# Patient Record
Sex: MALE | Race: WHITE | NOT HISPANIC OR LATINO | Employment: FULL TIME | URBAN - METROPOLITAN AREA
[De-identification: names, ages, dates, MRNs, and addresses within clinical notes are randomized per-mention and may not be internally consistent; named-entity substitution may affect disease eponyms.]

---

## 2019-08-30 ENCOUNTER — THERAPY VISIT (OUTPATIENT)
Dept: PHYSICAL THERAPY | Facility: CLINIC | Age: 59
End: 2019-08-30
Payer: COMMERCIAL

## 2019-08-30 DIAGNOSIS — M54.50 BILATERAL LOW BACK PAIN WITHOUT SCIATICA: ICD-10-CM

## 2019-08-30 PROCEDURE — 97161 PT EVAL LOW COMPLEX 20 MIN: CPT | Mod: GP | Performed by: PHYSICAL THERAPIST

## 2019-08-30 PROCEDURE — 97110 THERAPEUTIC EXERCISES: CPT | Mod: GP | Performed by: PHYSICAL THERAPIST

## 2019-08-30 ASSESSMENT — ACTIVITIES OF DAILY LIVING (ADL)
DEEP_SQUATTING: MODERATE DIFFICULTY
STANDING_FOR_15_MINUTES: MODERATE DIFFICULTY
RECREATIONAL_ACTIVITIES: MODERATE DIFFICULTY
HOW_WOULD_YOU_RATE_YOUR_CURRENT_LEVEL_OF_FUNCTION_DURING_YOUR_USUAL_ACTIVITIES_OF_DAILY_LIVING_FROM_0_TO_100_WITH_100_BEING_YOUR_LEVEL_OF_FUNCTION_PRIOR_TO_YOUR_HIP_PROBLEM_AND_0_BEING_THE_INABILITY_TO_PERFORM_ANY_OF_YOUR_USUAL_DAILY_ACTIVITIES?: 75
HOS_ADL_COUNT: 16
GETTING_INTO_AND_OUT_OF_AN_AVERAGE_CAR: SLIGHT DIFFICULTY
HOS_ADL_HIGHEST_POTENTIAL_SCORE: 64
HOS_ADL_SCORE(%): 65.62
SITTING_FOR_15_MINUTES: SLIGHT DIFFICULTY
WALKING_DOWN_STEEP_HILLS: MODERATE DIFFICULTY
HOS_ADL_ITEM_SCORE_TOTAL: 42
LIGHT_TO_MODERATE_WORK: SLIGHT DIFFICULTY
STEPPING_UP_AND_DOWN_CURBS: SLIGHT DIFFICULTY
ROLLING_OVER_IN_BED: SLIGHT DIFFICULTY
HEAVY_WORK: MODERATE DIFFICULTY
WALKING_15_MINUTES_OR_GREATER: MODERATE DIFFICULTY
PUTTING_ON_SOCKS_AND_SHOES: SLIGHT DIFFICULTY
TWISTING/PIVOTING_ON_INVOLVED_LEG: SLIGHT DIFFICULTY
WALKING_INITIALLY: SLIGHT DIFFICULTY
GOING_DOWN_1_FLIGHT_OF_STAIRS: SLIGHT DIFFICULTY
WALKING_UP_STEEP_HILLS: SLIGHT DIFFICULTY
GOING_UP_1_FLIGHT_OF_STAIRS: SLIGHT DIFFICULTY
WALKING_APPROXIMATELY_10_MINUTES: SLIGHT DIFFICULTY

## 2019-08-30 NOTE — PROGRESS NOTES
[facility] Initial Evaluation  Subjective:  The history is provided by the patient. No  was used.   Mat Gandara being seen for B Hip pain.   Problem began 8/16/2019 (referral date; Feb 2012 had L BREANN). Where condition occurred: other.Problem occurred: Arthritis  and reported as 3/10 on pain scale. General health as reported by patient is good. Pertinent medical history includes:  High blood pressure and migraines/headaches.    Surgeries include:  Orthopedic surgery. Other surgery history details: L BREANN Feb 2012.  Current medications:  High blood pressure medication.   Primary job tasks include:  Computer work and prolonged sitting.  Pain is described as aching and sharp and is intermittent. Pain is the same all the time. Since onset symptoms are gradually worsening.  Previous treatment includes physical therapy and surgery (L Hip). There was moderate improvement following previous treatment.   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 Restrictions include:  Working in normal job without restrictions.    Barriers include:  Stairs.  Red flags:  None as reported by patient.  Type of problem:  Bilateral hips   Condition occurred with:  Repetition/overuse. This is a chronic condition    Patient reports pain:  Greater trochanter and groin (groin R; lateral B pain). Radiates to:  Low back and thigh (R LE radiation> L). Associated symptoms:  Loss of motion/stiffness, loss of strength and tingling. Symptoms are exacerbated by running, transfers, bending/squatting and walking and relieved by rest, NSAID's and ice (does not do icing or NSAIDs regularly).                      Objective:  Standing Alignment:        Lumbar:  Anterior pelvic tilt                           Lumbar/SI Evaluation  ROM:    AROM Lumbar:   Flexion:            To mid lower leg  ++pull  low back  Ext:                    25%   Side Bend:        Left:  Mid thigh  +pain/pull R    Right:  Mid thigh +pain/pull L  Rotation:           Left:  Min limitation +pull/pain    Right:  Min limitation   Side Glide:        Left:     Right:           Lumbar Myotomes:  Lumbar myotomes: all levels WNL/ EQ B when tested in sitting; (-) Toe walking, (-) Heel walking.                                                              Hip Evaluation  HIP AROM:    Flexion: Left: 100    Right:  110 ++pain on release    Extension: Left: 10    Right:  15  Abduction: Left:  20    Right:  20      Internal Rotation: Left: 5    Right: 10  External Rotation: Left: 50    Right: 42        Hip Strength:  : glute max MMT: L 4/5, R 3/5.    Flexion:   Left: 3+/5   Pain:  Right: 4+/5   Pain:                    Extension:  Left: 5-/5  Pain:Right: 3+/5    Pain:    Abduction:  Left: 4/5     Pain:Right: 4/5    Pain:        Knee Flexion:  Left: 5/5   Pain:Right: 5/5   Pain:                         General     ROS    Assessment/Plan:    Patient is a 58 year old male with lumbar and both sides hip complaints.    Patient has the following significant findings with corresponding treatment plan.                Diagnosis 1:  B Hip and Low Back pain  Pain -  manual therapy, self management, education and home program  Decreased ROM/flexibility - manual therapy, therapeutic exercise, therapeutic activity and home program  Decreased strength - therapeutic exercise, therapeutic activities and home program  Impaired muscle performance - neuro re-education and home program  Decreased function - therapeutic activities and home program  Impaired posture - neuro re-education and home program    Therapy Evaluation Codes:   Cumulative Therapy Evaluation is: Low complexity.    Previous and current functional limitations:  (See Goal Flow Sheet for this information)    Short term and Long term goals: (See Goal Flow Sheet for this information)     Communication  ability:  Patient appears to be able to clearly communicate and understand verbal and written communication and follow directions correctly.  Treatment Explanation - The following has been discussed with the patient:   RX ordered/plan of care  Anticipated outcomes  Possible risks and side effects  This patient would benefit from PT intervention to resume normal activities.   Rehab potential is good.    Frequency:  1 X week, once daily  Duration:  for 6 weeks  Discharge Plan:  Achieve all LTG.  Independent in home treatment program.  Reach maximal therapeutic benefit.    Please refer to the daily flowsheet for treatment today, total treatment time and time spent performing 1:1 timed codes.

## 2019-08-30 NOTE — LETTER
[facility]  [address]  [facility] [address] [phone]    2019    Re: Mat Gandara   :   1960  MRN:  [id number]   REFERRING PHYSICIAN:   Cortney DENTON CNP    [facility] ATHLETIC [facility] PHYSICAL [facility]  Date of Initial Evaluation: 19  Visits:  Rxs Used: 1  Reason for Referral:  Bilateral low back pain without sciatica    EVALUATION SUMMARY    [facility] Initial Evaluation  Subjective:  The history is provided by the patient. No  was used.   Mat Gandara being seen for B Hip pain.   Problem began 2019 (referral date; 2012 had L BREANN). Where condition occurred: other.Problem occurred: Arthritis  and reported as 3/10 on pain scale. General health as reported by patient is good. Pertinent medical history includes:  High blood pressure and migraines/headaches.    Surgeries include:  Orthopedic surgery. Other surgery history details: L BREANN 2012.  Current medications:  High blood pressure medication.   Primary job tasks include:  Computer work and prolonged sitting.  Pain is described as aching and sharp and is intermittent. Pain is the same all the time. Since onset symptoms are gradually worsening.  Previous treatment includes physical therapy and surgery (L Hip). There was moderate improvement following previous treatment.   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 Restrictions include:  Working in normal job without restrictions.    Barriers include:  Stairs.  Red flags:  None as reported by patient.  Type of problem:  Bilateral hips  Condition occurred with:  Repetition/overuse. This is a chronic condition    Patient reports pain:  Greater trochanter and groin (groin R; lateral B pain). Radiates to:  Low back and thigh  (R LE radiation> L). Associated symptoms:  Loss of motion/stiffness, loss of strength and tingling. Symptoms are exacerbated by running, transfers, bending/squatting and walking and relieved by rest, NSAID's and ice (does not do icing or NSAIDs regularly).                Objective:  Standing Alignment:    Lumbar:  Anterior pelvic tilt    Lumbar/SI Evaluation  ROM:    AROM Lumbar:   Flexion:            To mid lower leg  ++pull low back  Ext:                    25%   Side Bend:        Left:  Mid thigh  +pain/pull R    Right:  Mid thigh +pain/pull L  Rotation:           Left:  Min limitation +pull/pain    Right:  Min limitation   Side Glide:        Left:     Right:       Lumbar Myotomes:  Lumbar myotomes: all levels WNL/ EQ B when tested in sitting; (-) Toe walking, (-) Heel walking.        Hip Evaluation  HIP AROM:    Flexion: Left: 100    Right:  110 ++pain on release  Extension: Left: 10    Right:  15  Abduction: Left:  20    Right:  20  Internal Rotation: Left: 5    Right: 10  External Rotation: Left: 50    Right: 42      Hip Strength:  : glute max MMT: L 4/5, R 3/5.  Flexion:   Left: 3+/5   Pain:  Right: 4+/5   Pain:               Extension:  Left: 5-/5  Pain:Right: 3+/5    Pain:    Abduction:  Left: 4/5     Pain:Right: 4/5    Pain:  Knee Flexion:  Left: 5/5   Pain:Right: 5/5   Pain:    Assessment/Plan:    Patient is a 58 year old male with lumbar and both sides hip complaints.    Patient has the following significant findings with corresponding treatment plan.                Diagnosis 1:  B Hip and Low Back pain  Pain -  manual therapy, self management, education and home program  Decreased ROM/flexibility - manual therapy, therapeutic exercise, therapeutic activity and home program  Decreased strength - therapeutic exercise, therapeutic activities and home program  Impaired muscle performance - neuro re-education and home program  Decreased function - therapeutic activities and home program  Impaired posture -  neuro re-education and home program    Therapy Evaluation Codes:   Cumulative Therapy Evaluation is: Low complexity.    Previous and current functional limitations:  (See Goal Flow Sheet for this information)    Short term and Long term goals: (See Goal Flow Sheet for this information)     Communication ability:  Patient appears to be able to clearly communicate and understand verbal and written communication and follow directions correctly.  Treatment Explanation - The following has been discussed with the patient:   RX ordered/plan of care  Anticipated outcomes  Possible risks and side effects  This patient would benefit from PT intervention to resume normal activities.   Rehab potential is good.    Frequency:  1 X week, once daily  Duration:  for 6 weeks  Discharge Plan:  Achieve all LTG.  Independent in home treatment program.  Reach maximal therapeutic benefit.    Thank you for your referral.    INQUIRIES  Therapist: Amanda Hilligoss DPCAYETANO  [facility]  [address] [place] [address] [address]  [facility] [address]  Phone: [phone]  Fax: [phone]

## 2019-09-20 ENCOUNTER — THERAPY VISIT (OUTPATIENT)
Dept: PHYSICAL THERAPY | Facility: CLINIC | Age: 59
End: 2019-09-20
Payer: COMMERCIAL

## 2019-09-20 DIAGNOSIS — M25.552 BILATERAL HIP PAIN: ICD-10-CM

## 2019-09-20 DIAGNOSIS — M54.50 BILATERAL LOW BACK PAIN WITHOUT SCIATICA: ICD-10-CM

## 2019-09-20 DIAGNOSIS — M25.551 BILATERAL HIP PAIN: ICD-10-CM

## 2019-09-20 PROCEDURE — 97112 NEUROMUSCULAR REEDUCATION: CPT | Mod: GP | Performed by: PHYSICAL THERAPIST

## 2019-09-20 PROCEDURE — 97110 THERAPEUTIC EXERCISES: CPT | Mod: GP | Performed by: PHYSICAL THERAPIST

## 2019-10-09 ENCOUNTER — THERAPY VISIT (OUTPATIENT)
Dept: PHYSICAL THERAPY | Facility: CLINIC | Age: 59
End: 2019-10-09
Payer: COMMERCIAL

## 2019-10-09 DIAGNOSIS — M54.50 BILATERAL LOW BACK PAIN WITHOUT SCIATICA: ICD-10-CM

## 2019-10-09 DIAGNOSIS — M25.551 BILATERAL HIP PAIN: ICD-10-CM

## 2019-10-09 DIAGNOSIS — M25.552 BILATERAL HIP PAIN: ICD-10-CM

## 2019-10-09 PROCEDURE — 97110 THERAPEUTIC EXERCISES: CPT | Mod: GP | Performed by: PHYSICAL THERAPIST

## 2019-10-09 PROCEDURE — 97140 MANUAL THERAPY 1/> REGIONS: CPT | Mod: GP | Performed by: PHYSICAL THERAPIST

## 2019-10-09 PROCEDURE — 97112 NEUROMUSCULAR REEDUCATION: CPT | Mod: GP | Performed by: PHYSICAL THERAPIST

## 2019-12-16 PROBLEM — M54.50 BILATERAL LOW BACK PAIN WITHOUT SCIATICA: Status: RESOLVED | Noted: 2019-08-30 | Resolved: 2019-12-16

## 2019-12-16 NOTE — PROGRESS NOTES
Discharge Note    Progress reporting period is from initial evaluation date (please see noted date below) to Oct 9, 2019.  Linked Episodes   Type: Episode: Status: Noted: Resolved: Last update: Updated by:   PHYSICAL THERAPY B Hip pain and LBP 8-30-19 Active 8/30/2019  10/9/2019  4:02 PM Hilligoss, Amanda K, PT      Comments:       Mat failed to follow up and current status is unknown.  Please see information below for last relevant information on current status.  Patient seen for 3 visits.    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 Also developled R heel pain around this time. (was standing on steps w/ weight on toes during this time). Feels he has been limping some due to this and it is affecting his low back and hip pain.  .  Current pain level is 4/10(in posterior lower leg).     Previous pain level was  7/10.   Changes in function:  Yes (See Goal flowsheet attached for changes in current functional level)  Adverse reaction to treatment or activity: None    OBJECTIVE  Changes noted in objective findings: Hip strength: Flex 5/5 B; ABd L 5-/5, R 4/5 ++pain (TFL tender/hypertonic, hip abd improves to 5-/5 after TFM over TFL)Tenderness R lateral achilles tendon insertion; Decreased flexibility R hamstring vs L     ASSESSMENT/PLAN  Diagnosis: Chronic R Hip pain   Updated problem list and treatment plan:   Pain - HEP  Decreased ROM/flexibility - HEP  Decreased function - HEP  Decreased strength - HEP  Impaired muscle performance - HEP  Impaired posture - HEP  STG/LTGs have been met or progress has been made towards goals:  Yes, please see goal flowsheet for most current information  Assessment of Progress: current status is unknown.    Last current status: Pt is progressing as expected   Self Management Plans:  HEP  I have re-evaluated this patient and find that the nature, scope, duration and intensity  of the therapy is appropriate for the medical condition of the patient.  Mat continues to require the following intervention to meet STG and LTG's:  HEP.    Recommendations:  Discharge with current home program.  Patient to follow up with MD as needed.    Please refer to the daily flowsheet for treatment today, total treatment time and time spent performing 1:1 timed codes.

## 2022-04-08 ENCOUNTER — TRANSCRIBE ORDERS (OUTPATIENT)
Dept: OTHER | Age: 62
End: 2022-04-08
Payer: COMMERCIAL

## 2022-04-08 DIAGNOSIS — S13.9XXA NECK SPRAIN, INITIAL ENCOUNTER: Primary | ICD-10-CM

## 2022-04-18 ENCOUNTER — THERAPY VISIT (OUTPATIENT)
Dept: PHYSICAL THERAPY | Facility: CLINIC | Age: 62
End: 2022-04-18
Attending: FAMILY MEDICINE
Payer: COMMERCIAL

## 2022-04-18 DIAGNOSIS — M54.2 NECK PAIN: ICD-10-CM

## 2022-04-18 DIAGNOSIS — S13.9XXA NECK SPRAIN, INITIAL ENCOUNTER: ICD-10-CM

## 2022-04-18 PROCEDURE — 97161 PT EVAL LOW COMPLEX 20 MIN: CPT | Mod: GP | Performed by: PHYSICAL THERAPIST

## 2022-04-18 PROCEDURE — 97140 MANUAL THERAPY 1/> REGIONS: CPT | Mod: GP | Performed by: PHYSICAL THERAPIST

## 2022-04-18 PROCEDURE — 97110 THERAPEUTIC EXERCISES: CPT | Mod: GP | Performed by: PHYSICAL THERAPIST

## 2022-04-18 NOTE — LETTER
[facility]  [address]  [facility] [address]  [phone]    2022  Re: Mat Gandara   :   1960  MRN:  [id number]   REFERRING PHYSICIAN:   Fabiola Richardson MD    [facility]  Date of Initial Evaluation:  2022  Visits:  Rxs Used: 1  Reason for Referral:     Neck sprain, initial encounter  Neck pain    EVALUATION SUMMARY    Physical Therapy Initial Evaluation  Subjective:  The history is provided by the patient. No  was used.   Patient Health History  Mat Gandara being seen for Neck.   Problem began: 2021.   Problem occurred: Unknown   Pain is reported as 1/10 on pain scale.  General health as reported by patient is good.  Pertinent medical history includes: none.   Red flags:  None as reported by patient.  Medical allergies: none.   Surgeries include:  Orthopedic surgery.    Current medications:  High blood pressure medication.    Current occupation is Director.   Primary job tasks include:  Prolonged sitting.             Therapist Generated HPI Evaluation  Problem details: Last 4-6 months had some neck stiffness which may be due to ergonomics when working from. Not much pain, more so numbness with extension or reaching the with the R arm. Is a L sided sleeper.  Type of problem:  Cervical spine.  This is a chronic condition.  Condition occurred with:  Other reason.  Patient reports pain:  Mid cervical spine and lower cervical spine.  Pain described: sharp, aching (Sharp with turning, dull with extension) & intermittent.  Pain radiates to:  Shoulder right, upper arm right and elbow right. Pain is the same all the time (More related to activity ).  Since onset symptoms are gradually improving.  Associated symptoms:  Loss of motion/stiffness. Symptoms are exacerbated by looking up or down, driving and rotating head (Rotating to the L )  and relieved  by rest (If R arm goes numb, then L lateral flexion relieves symptoms).  Special tests included:  Other.  Mat Gandara , : 1960, MRN: [id number], page 2    Previous treatment includes other. No improvement following previous treatment.  Restrictions due to condition include:  Working in normal job without restrictions.  Barriers include:  None as reported by patient.  Cervical Objective Findings  Posture: Fwd head, slouched shoulders, increased kyphosis  Range of Motion:  Cervical Flexion                              WNL                                                                                                                   Cervical Extension 75% with pain   Cervical Side Bending R     75% L    75%   Cervical Rotation R     75% L     75%   Thoracic Flexion    Thoracic Extension    Thoracic Side Bending R L   Thoracic Rotation R 75% L 75%   Other:    (in degrees)  Manual Muscle Testing (required if radicular)  (graded 0-5, measured at 0 degrees unless otherwise noted):  Grossly WNL                                                                                        Right                                                  Left                                                      Shoulder shrug (C2-C4)     Shoulder Abd (C5)     Shoulder Add (C7)     Shoulder ER (C5,C6)     Shoulder IR (C5,C6)     Elbow Flex (C5,C6)     Elbow Ext (C7)     Wrist Ext (C6)     Wrist Flex (C7)     Thumb Abd (C8)     5th Finger Add (T1)     (+ mild pain, ++ moderate pain, +++ severe pain)    Dermatome/Sensory Testing: WNL  Special Tests:                                                                                                                    Positive                    Negative                         Vetebral Artery Test     Alar Ligament Test     Transverse Ligament Test     Spurling s Test x    Cervical Distraction Test x    Neck Flexor Endurance Test (normal 39 sec) 27sec    Cervical Rotation/Lateral Flexion  Test                   Segmental Mobility:    Cervical:  Hypomobile sideglides bilaterally C3-C6  Thoracic:  Hypomobile PA's T1-T7  Palpation: Hypertonicity in Bilateral scalenes, suboccipitals    -------------------------------------------  Symptoms beyond the shoulder attach: .CERVICALRADICULAR    Assessment/Plan:    Patient is a 61 year old male with cervical complaints.    Patient has the following significant findings with corresponding treatment plan.                Diagnosis 1:  History and objective findings consistent with Right cervical facet irritation.   Pain -  manual therapy, self management, education and home program  Decreased ROM/flexibility - manual therapy, ther exer, thera activity, home program  Decreased joint mobility - manual therapy, ther exer, thera activity, home program  Decreased strength - therapeutic exercise, therapeutic activities and home program  Impaired posture - neuro re-education, therapeutic activities and home program    Therapy Evaluation Codes:   1) History comprised of:  Personal factors that impact the plan of care: None.    Comorbidity factors that impact the plan of care are: None.     Medications impacting care: High blood pressure.  2) Examination of Body Systems comprised of: Body structures and functions that impact the plan of care:  Cervical spine.   Activity limitations that impact the plan of care are: Driving.  3) Clinical presentation characteristics are: Stable/Uncomplicated.  4) Decision-Making: Low complexity using standardized patient assessment instrument and/or measureable assessment of functional outcome.  Cumulative Therapy Evaluation is: Low complexity.  Previous and current functional limitations:  (See Goal Flow Sheet for this information)    Short term and Long term goals: (See Goal Flow Sheet for this information)   Communication ability:  Patient appears to be able to clearly communicate and understand verbal and written communication and follow  directions correctly.  Treatment Explanation - The following has been discussed with the patient:   RX ordered/plan of care. Anticipated outcomes. Possible risks and side effects  This patient would benefit from PT intervention to resume normal activities.   Rehab potential is good.    Frequency:  1 X week, once daily  Duration:  for 6 weeks  Discharge Plan:  Achieve all LTG.  Independent in home treatment program.  Reach maximal therapeutic benefit.    Erwin Nails SPT; Isiah Simeon PT, DPT      Thank you for your referral.    INQUIRIES  Therapist: Isiah Simeon, PT, DPT   [address] [address]  Phone: [phone]. Fax: [phone]

## 2022-04-18 NOTE — PROGRESS NOTES
Physical Therapy Initial Evaluation  Subjective:  The history is provided by the patient. No  was used.   Patient Health History  Mat Gandara being seen for Neck.     Problem began: 9/18/2021.   Problem occurred: Unknown   Pain is reported as 1/10 on pain scale.  General health as reported by patient is good.  Pertinent medical history includes: none.   Red flags:  None as reported by patient.  Medical allergies: none.   Surgeries include:  Orthopedic surgery.    Current medications:  High blood pressure medication.    Current occupation is Director.   Primary job tasks include:  Prolonged sitting.                  Therapist Generated HPI Evaluation  Problem details: Last 4-6 months had some neck stiffness which may be due to ergonomics when working from. Not much pain, more so numbness with extension or reaching the with the R arm. Is a L sided sleeper. .         Type of problem:  Cervical spine.    This is a chronic condition.  Condition occurred with:  Other reason.    Patient reports pain:  Mid cervical spine and lower cervical spine.  Pain is described as sharp and aching (Sharp with turning, dull with extension) and is intermittent.  Pain radiates to:  Shoulder right, upper arm right and elbow right. Pain is the same all the time (More related to activity ).  Since onset symptoms are gradually improving.  Associated symptoms:  Loss of motion/stiffness. Symptoms are exacerbated by looking up or down, driving and rotating head (Rotating to the L )  and relieved by rest (If R arm goes numb, then L lateral flexion relieves symptoms).  Special tests included:  Other.  Previous treatment includes other. There was none improvement following previous treatment.  Restrictions due to condition include:  Working in normal job without restrictions.  Barriers include:  None as reported by patient.                        Objective:  System    Physical Exam    General     ROS      Mat ABRAHAM Juliocesar ,  : 1960, MRN: [id number]    Physical Therapy Objective Findings  Subjective information, goals, clinical impression, daily documentation and other information found in EPISODES tab.    Cervical Objective Findings  Objective:  Posture: Fwd head, slouched shoulders, increased kyphosis  Range of Motion:  Cervical Flexion                              WNL                                                                                                                   Cervical Extension 75% with pain   Cervical Side Bending R     75% L    75%   Cervical Rotation R     75% L     75%   Thoracic Flexion    Thoracic Extension    Thoracic Side Bending R L   Thoracic Rotation R 75% L 75%   Other:    (in degrees)  Shoulder Screen:   AROM Findings                                                                            WNL                                                                            Manual Muscle Testing (required if radicular)  (graded 0-5, measured at 0 degrees unless otherwise noted):  Grossly WNL                                                                                        Right                                                  Left                                                      Shoulder shrug (C2-C4)     Shoulder Abd (C5)     Shoulder Add (C7)     Shoulder ER (C5,C6)     Shoulder IR (C5,C6)     Elbow Flex (C5,C6)     Elbow Ext (C7)     Wrist Ext (C6)     Wrist Flex (C7)     Thumb Abd (C8)     5th Finger Add (T1)     (+ mild pain, ++ moderate pain, +++ severe pain)    Dermatome/Sensory Testing: WNL  Reflex Testing:  Jaw  Jerk (brainstem)                                                                                                                                               Scapulohumeral (C1-C4)    Biceps (C5,C6)    Brachioradialis (C6)  - Finger flexion with test is pathological    Triceps (C7)        Special Tests:                                                                                                                     Positive                    Negative                         Vetebral Artery Test     Alar Ligament Test     Transverse Ligament Test     Spurling s Test x    Cervical Distraction Test x    Neck Flexor Endurance Test (normal 39 sec) 27sec    Cervical Rotation/Lateral Flexion Test                     Segmental Mobility:     Cervical:  Hypomobile sideglides bilaterally C3-C6   Thoracic:  Hypomobile PA's T1-T7  Palpation: Hypertonicity in Bilateral scalenes, suboccipitals          -------------------------------------------  Symptoms beyond the shoulder attach: .CERVICALRADICULAR      Assessment/Plan:    Patient is a 61 year old male with cervical complaints.    Patient has the following significant findings with corresponding treatment plan.                Diagnosis 1:  History and objective findings consistent with Right cervical facet irritation.   Pain -  manual therapy, self management, education and home program  Decreased ROM/flexibility - manual therapy, therapeutic exercise, therapeutic activity and home program  Decreased joint mobility - manual therapy, therapeutic exercise, therapeutic activity and home program  Decreased strength - therapeutic exercise, therapeutic activities and home program  Impaired posture - neuro re-education, therapeutic activities and home program    Therapy Evaluation Codes:   1) History comprised of:   Personal factors that impact the plan of care:      None.    Comorbidity factors that impact the plan of care are:      None.     Medications impacting care: High blood pressure.  2) Examination of Body Systems comprised of:   Body structures and functions that impact the plan of care:      Cervical spine.   Activity limitations that impact the plan of care are:      Driving.  3) Clinical presentation characteristics are:   Stable/Uncomplicated.  4) Decision-Making    Low complexity using standardized patient assessment instrument and/or  measureable assessment of functional outcome.  Cumulative Therapy Evaluation is: Low complexity.    Previous and current functional limitations:  (See Goal Flow Sheet for this information)    Short term and Long term goals: (See Goal Flow Sheet for this information)     Communication ability:  Patient appears to be able to clearly communicate and understand verbal and written communication and follow directions correctly.  Treatment Explanation - The following has been discussed with the patient:   RX ordered/plan of care  Anticipated outcomes  Possible risks and side effects  This patient would benefit from PT intervention to resume normal activities.   Rehab potential is good.    Frequency:  1 X week, once daily  Duration:  for 6 weeks  Discharge Plan:  Achieve all LTG.  Independent in home treatment program.  Reach maximal therapeutic benefit.    Please refer to the daily flowsheet for treatment today, total treatment time and time spent performing 1:1 timed codes.     Erwin Nails SPT; Isiah Simeon PT

## 2022-05-11 ENCOUNTER — THERAPY VISIT (OUTPATIENT)
Dept: PHYSICAL THERAPY | Facility: CLINIC | Age: 62
End: 2022-05-11
Payer: COMMERCIAL

## 2022-05-11 DIAGNOSIS — M54.2 NECK PAIN: Primary | ICD-10-CM

## 2022-05-11 PROCEDURE — 97140 MANUAL THERAPY 1/> REGIONS: CPT | Mod: GP | Performed by: PHYSICAL THERAPY ASSISTANT

## 2022-05-11 PROCEDURE — 97110 THERAPEUTIC EXERCISES: CPT | Mod: GP | Performed by: PHYSICAL THERAPY ASSISTANT

## 2022-05-24 ENCOUNTER — THERAPY VISIT (OUTPATIENT)
Dept: PHYSICAL THERAPY | Facility: CLINIC | Age: 62
End: 2022-05-24
Payer: COMMERCIAL

## 2022-05-24 DIAGNOSIS — M54.2 NECK PAIN: Primary | ICD-10-CM

## 2022-05-24 PROCEDURE — 97140 MANUAL THERAPY 1/> REGIONS: CPT | Mod: GP | Performed by: PHYSICAL THERAPY ASSISTANT

## 2022-05-24 PROCEDURE — 97112 NEUROMUSCULAR REEDUCATION: CPT | Mod: GP | Performed by: PHYSICAL THERAPY ASSISTANT

## 2022-05-24 PROCEDURE — 97110 THERAPEUTIC EXERCISES: CPT | Mod: GP | Performed by: PHYSICAL THERAPY ASSISTANT

## 2022-07-08 PROBLEM — M54.2 NECK PAIN: Status: RESOLVED | Noted: 2022-04-18 | Resolved: 2022-07-08

## 2022-07-08 NOTE — PROGRESS NOTES
Discharge Note    Progress reporting period is from initial evaluation date (please see noted date below) to May 24, 2022.  Linked Episodes   Type: Episode: Status: Noted: Resolved: Last update: Updated by:   PHYSICAL THERAPY Neck Pain 4/18/2022 Active 4/18/2022 5/24/2022  3:37 PM Zafar Nails      Comments:       Mat failed to follow up and current status is unknown.  Please see information below for last relevant information on current status.  Patient seen for 3 visits.    SUBJECTIVE  Subjective changes noted by patient:  Symptoms intermittent in neck. Will have tingling sx in R upper arm when tilting head with shaving face. Pt wearing a different pair of glasses that seems to help avoid holding head slightly extended to see through progressive lens, still may consider going back to single lens as he feels the progressive lens has contributed to neck pain.  .  Current pain level is 1/10.     Previous pain level was  1/10.   Changes in function:  Yes (See Goal flowsheet attached for changes in current functional level)  Adverse reaction to treatment or activity: None    OBJECTIVE  Changes noted in objective findings: R rot WNL with pinch R cv, L rot WNL with pull on R cv, flex WNL, ext 75% with pinch. Tends to rotate more through upper cervical region.     ASSESSMENT/PLAN  Diagnosis: Neck pain w/ R sided radiculopthy   Updated problem list and treatment plan:   Pain - HEP  Decreased ROM/flexibility - HEP  Decreased function - HEP  STG/LTGs have been met or progress has been made towards goals:  Yes, please see goal flowsheet for most current information  Assessment of Progress: current status is unknown.    Last current status: Pt is progressing as expected   Self Management Plans:  HEP  I have re-evaluated this patient and find that the nature, scope, duration and intensity of the therapy is appropriate for the medical condition of the patient.  Mat continues to require the following intervention to meet  STG and LTG's:  HEP.    Recommendations:  Discharge with current home program.  Patient to follow up with MD as needed.    Please refer to the daily flowsheet for treatment today, total treatment time and time spent performing 1:1 timed codes.    Isiah Amezcua,PT, DPT, OCS